# Patient Record
Sex: MALE | Race: WHITE | Employment: FULL TIME | URBAN - METROPOLITAN AREA
[De-identification: names, ages, dates, MRNs, and addresses within clinical notes are randomized per-mention and may not be internally consistent; named-entity substitution may affect disease eponyms.]

---

## 2017-04-17 ENCOUNTER — OFFICE VISIT (OUTPATIENT)
Dept: MEDICAL GROUP | Age: 27
End: 2017-04-17
Payer: COMMERCIAL

## 2017-04-17 VITALS
BODY MASS INDEX: 20.44 KG/M2 | OXYGEN SATURATION: 99 % | DIASTOLIC BLOOD PRESSURE: 76 MMHG | WEIGHT: 146 LBS | HEIGHT: 71 IN | HEART RATE: 106 BPM | SYSTOLIC BLOOD PRESSURE: 128 MMHG | TEMPERATURE: 97.3 F

## 2017-04-17 DIAGNOSIS — R63.4 WEIGHT LOSS: ICD-10-CM

## 2017-04-17 DIAGNOSIS — Z00.00 PREVENTATIVE HEALTH CARE: ICD-10-CM

## 2017-04-17 DIAGNOSIS — Z23 NEED FOR VACCINATION: ICD-10-CM

## 2017-04-17 DIAGNOSIS — G40.A01 NONINTRACTABLE ABSENCE EPILEPSY WITH STATUS EPILEPTICUS (HCC): ICD-10-CM

## 2017-04-17 DIAGNOSIS — D17.0 LIPOMA OF FACE: ICD-10-CM

## 2017-04-17 PROCEDURE — 90651 9VHPV VACCINE 2/3 DOSE IM: CPT | Performed by: FAMILY MEDICINE

## 2017-04-17 PROCEDURE — 99214 OFFICE O/P EST MOD 30 MIN: CPT | Mod: 25 | Performed by: FAMILY MEDICINE

## 2017-04-17 PROCEDURE — 90632 HEPA VACCINE ADULT IM: CPT | Performed by: FAMILY MEDICINE

## 2017-04-17 PROCEDURE — 90472 IMMUNIZATION ADMIN EACH ADD: CPT | Performed by: FAMILY MEDICINE

## 2017-04-17 PROCEDURE — 90471 IMMUNIZATION ADMIN: CPT | Performed by: FAMILY MEDICINE

## 2017-04-17 NOTE — ASSESSMENT & PLAN NOTE
Continues to be a symptomatic  2006 with his last seizure  Continues to be compliant with Keppra  Managed by his neurologist Dr. Young

## 2017-04-17 NOTE — ASSESSMENT & PLAN NOTE
LOST 120 Pounds in over a year   By diet and exercise  He denies any fevers, no chills, no night sweats, no bleeding gums, no easy bruisability, no recurrent nosebleeds, no blood in stool  Father had skin cancer  And PGF with prostate cancer

## 2017-04-17 NOTE — MR AVS SNAPSHOT
"        Jt House   2017 11:40 AM   Office Visit   MRN: [id number]    Department:  [address] [place] [address]   Dept Phone:  [phone]    Description:  Male : 1990   Provider:  Daniel Abreu M.D.           Allergies as of 2017     No Known Allergies      You were diagnosed with     Need for vaccination   [689297]       Preventative health care   [297979]       Lipoma of face   [005501]       Weight loss   [451853]         Vital Signs     Blood Pressure Pulse Temperature Height Weight Body Mass Index    128/76 mmHg 106 36.3 °C (97.3 °F) 1.803 m (5' 11\") 66.225 kg (146 lb) 20.37 kg/m2    Oxygen Saturation Smoking Status                99% Never Smoker           Basic Information     Date Of Birth Sex Race Ethnicity Preferred Language    1990 Male Unknown Unknown English      Your appointments     2017 11:00 AM   Established Patient with Daniel Abreu M.D.   [address] [id number] [phone]           You will be receiving a confirmation call a few days before your appointment from our automated call confirmation system.              Problem List              ICD-10-CM Priority Class Noted - Resolved    Absence epilepsy (CMS-HCC) G40.A09   10/27/2016 - Present    Preventative health care Z00.00   10/27/2016 - Present    Need for vaccination Z23   10/27/2016 - Present    Lipoma of face D17.0   2017 - Present    Weight loss R63.4   2017 - Present      Health Maintenance        Date Due Completion Dates    IMM HPV VACCINE (1 of 3 - Male 3 Dose Series) 2001 ---    IMM HEP B VACCINE (2 of 3 - Primary Series) 2008    IMM VARICELLA (CHICKENPOX) VACCINE (2 of 2 - 2 Dose Adolescent Series) 2008    IMM HEP A VACCINE (2 of 2 - Standard Series) 2008    IMM DTaP/Tdap/Td Vaccine (7 - Td) 2019, 1996, 1992, 1991, " 5/13/1991, 3/12/1991            Current Immunizations     Dtap Vaccine 4/25/1996, 8/26/1992, 8/12/1991, 5/13/1991, 3/12/1991    HIB Vaccine (ACTHIB/HIBERIX) 8/26/1992, 8/12/1991, 5/13/1991, 3/12/1991    HPV 9-VALENT VACCINE (GARDASIL 9)  Incomplete    Hepatitis A Vaccine, Adult  Incomplete    Hepatitis A Vaccine, Ped/Adol 11/24/2008    Hepatitis B Vaccine Non-Recombivax (Ped/Adol) 11/24/2008    Influenza TIV (IM) 10/21/2014, 10/8/2013, 11/24/2008    Influenza Vaccine Quad Inj (Preserved) 10/6/2015    MMR Vaccine 4/25/1996, 4/13/1992    Meningococcal Conjugate Vaccine MCV4 (Menactra) 11/24/2008    OPV - Historical Data 4/25/1996, 5/13/1991, 4/15/1991, 3/12/1991    Tdap Vaccine 5/18/2009    Varicella Vaccine Live 11/24/2008      Below and/or attached are the medications your provider expects you to take. Review all of your home medications and newly ordered medications with your provider and/or pharmacist. Follow medication instructions as directed by your provider and/or pharmacist. Please keep your medication list with you and share with your provider. Update the information when medications are discontinued, doses are changed, or new medications (including over-the-counter products) are added; and carry medication information at all times in the event of emergency situations     Allergies:  No Known Allergies          Medications  Valid as of: April 17, 2017 - 11:59 AM    Generic Name Brand Name Tablet Size Instructions for use    LevETIRAcetam (Tab) KEPPRA 500 MG Take 500 mg by mouth 2 times a day.        .                 Medicines prescribed today were sent to:     [facility]/PHARMACY #[id number] - [place], [place] - [address]    [address] [facility] [place] [address]    Phone: [phone] Fax: [phone]    Open 24 Hours?: No      Medication refill instructions:       If your prescription bottle indicates you have medication refills left, it is not necessary to call your provider’s office. Please contact your pharmacy and  they will refill your medication.    If your prescription bottle indicates you do not have any refills left, you may request refills at any time through one of the following ways: The online [employer] system (except Urgent Care), by calling your provider’s office, or by asking your pharmacy to contact your provider’s office with a refill request. Medication refills are processed only during regular business hours and may not be available until the next business day. Your provider may request additional information or to have a follow-up visit with you prior to refilling your medication.   *Please Note: Medication refills are assigned a new Rx number when refilled electronically. Your pharmacy may indicate that no refills were authorized even though a new prescription for the same medication is available at the pharmacy. Please request the medicine by name with the pharmacy before contacting your provider for a refill.        Your To Do List     Future Labs/Procedures Complete By Expires    CBC WITH DIFFERENTIAL  As directed 4/17/2018    COMP METABOLIC PANEL  As directed 4/17/2018    LIPID PROFILE  As directed 4/17/2018      Referral     A referral request has been sent to our patient care coordination department. Please allow 3-5 business days for us to process this request and contact you either by phone or mail. If you do not hear from us by the 5th business day, please call us at [phone].           [employer] Status: Patient Declined

## 2017-04-17 NOTE — ASSESSMENT & PLAN NOTE
Patient states that he's noticed a slight pull on his left face which has been there for many years. More noticeable now because he lost 20 pounds through diet and exercise, he would like to have it removed because it is causing pain.

## 2017-04-17 NOTE — PROGRESS NOTES
This medical record contains text that has been entered with the assistance of computer voice recognition and dictation software.  Therefore, it may contain unintended errors in text, spelling, punctuation, or grammar    No chief complaint on file.      Jt House is a 26 y.o. male here evaluation and management of: lipoma, epilepsy, vaccinations      HPI:     Weight loss  LOST 120 Pounds in over a year   By diet and exercise  He denies any fevers, no chills, no night sweats, no bleeding gums, no easy bruisability, no recurrent nosebleeds, no blood in stool  Father had skin cancer  And PGF with prostate cancer    Need for vaccination  Patient is due for several vaccinations today    Lipoma of face  Patient states that he's noticed a slight pull on his left face which has been there for many years. More noticeable now because he lost 20 pounds through diet and exercise, he would like to have it removed because it is causing pain.    Absence epilepsy (CMS-HCC)  Continues to be a symptomatic  2006 with his last seizure  Continues to be compliant with Keppra  Managed by his neurologist Dr. Young      Current medicines (including changes today)  Current Outpatient Prescriptions   Medication Sig Dispense Refill   • levetiracetam (KEPPRA) 500 MG Tab Take 500 mg by mouth 2 times a day.  3     No current facility-administered medications for this visit.     He  has a past medical history of Seizure (CMS-HCC).  He  has past surgical history that includes appendectomy (2003).  Social History   Substance Use Topics   • Smoking status: Never Smoker    • Smokeless tobacco: Never Used   • Alcohol Use: 0.0 oz/week     0 Standard drinks or equivalent per week      Comment: ocassion     Social History     Social History Narrative     Family History   Problem Relation Age of Onset   • Other Mother      Lupus   • Heart Disease Father    • Lung Disease Father    • No Known Problems Brother    • Psychiatry Maternal Grandmother       "depression   • Stroke Maternal Grandmother    • Heart Disease Maternal Grandfather    • No Known Problems Paternal Grandmother    • Cancer Paternal Grandfather      prostate cancer   • Diabetes Brother    • Asthma Brother    • Psychiatry Brother      Family Status   Relation Status Death Age   • Mother Alive    • Father     • Brother Alive    • Maternal Grandmother     • Maternal Grandfather     • Paternal Grandmother Alive    • Paternal Grandfather     • Brother Alive          ROS  Please see history of present illness    All other systems reviewed and are negative     Objective:     Blood pressure 128/76, pulse 106, temperature 36.3 °C (97.3 °F), height 1.803 m (5' 11\"), weight 66.225 kg (146 lb), SpO2 99 %. Body mass index is 20.37 kg/(m^2).  Physical Exam:    Constitutional: Alert, no distress.  Skin: Warm, dry, good turgor, no rashes in visible areas.  Face--below left cheekbone there is a 0.5 cm round symmetric, nontender, non-mobile mass, regular borders,  Eye: Equal, round and reactive, conjunctiva clear, lids normal.  ENMT: Lips without lesions, good dentition, oropharynx clear.  Neck: Trachea midline, no masses, no thyromegaly. No cervical or supraclavicular lymphadenopathy.  Respiratory: Unlabored respiratory effort, lungs clear to auscultation, no wheezes, no ronchi.  Cardiovascular: Normal S1, S2, no murmur, no edema.  Abdomen: Soft, non-tender, no masses, no hepatosplenomegaly.  Psych: Alert and oriented x3, normal affect and mood.          Assessment and Plan:   The following treatment plan was discussed, again this medical record contains text that has been entered with the assistance of computer voice recognition and dictation software.  Therefore, it may contain unintended errors in text, spelling, punctuation, or grammar      1. Lipoma of face    - REFERRAL TO DERMATOLOGY        2. Preventative health care  Needs new labs    - COMP METABOLIC PANEL; Future  - CBC " WITH DIFFERENTIAL; Future  - LIPID PROFILE; Future      3. Weight loss  This was done through diet and exercise  He is certain this was intentional    5. 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 Need for vaccination    Given today  - GARDASIL 9  - Hepatitis A Vaccine Adult IM        Followup: Return in about 3 months (around 7/17/2017) for Reevaluation.

## 2017-04-19 ENCOUNTER — HOSPITAL ENCOUNTER (OUTPATIENT)
Dept: LAB | Facility: MEDICAL CENTER | Age: 27
End: 2017-04-19
Attending: FAMILY MEDICINE
Payer: COMMERCIAL

## 2017-04-19 LAB
BASOPHILS # BLD AUTO: 0.8 % (ref 0–1.8)
BASOPHILS # BLD: 0.05 K/UL (ref 0–0.12)
EOSINOPHIL # BLD AUTO: 0.43 K/UL (ref 0–0.51)
EOSINOPHIL NFR BLD: 7 % (ref 0–6.9)
ERYTHROCYTE [DISTWIDTH] IN BLOOD BY AUTOMATED COUNT: 44.6 FL (ref 35.9–50)
HCT VFR BLD AUTO: 46.5 % (ref 42–52)
HGB BLD-MCNC: 15.7 G/DL (ref 14–18)
IMM GRANULOCYTES # BLD AUTO: 0.01 K/UL (ref 0–0.11)
IMM GRANULOCYTES NFR BLD AUTO: 0.2 % (ref 0–0.9)
LYMPHOCYTES # BLD AUTO: 2.41 K/UL (ref 1–4.8)
LYMPHOCYTES NFR BLD: 39.3 % (ref 22–41)
MCH RBC QN AUTO: 31 PG (ref 27–33)
MCHC RBC AUTO-ENTMCNC: 33.8 G/DL (ref 33.7–35.3)
MCV RBC AUTO: 91.9 FL (ref 81.4–97.8)
MONOCYTES # BLD AUTO: 0.5 K/UL (ref 0–0.85)
MONOCYTES NFR BLD AUTO: 8.2 % (ref 0–13.4)
NEUTROPHILS # BLD AUTO: 2.73 K/UL (ref 1.82–7.42)
NEUTROPHILS NFR BLD: 44.5 % (ref 44–72)
NRBC # BLD AUTO: 0 K/UL
NRBC BLD AUTO-RTO: 0 /100 WBC
PLATELET # BLD AUTO: 282 K/UL (ref 164–446)
PMV BLD AUTO: 10.8 FL (ref 9–12.9)
RBC # BLD AUTO: 5.06 M/UL (ref 4.7–6.1)
WBC # BLD AUTO: 6.1 K/UL (ref 4.8–10.8)

## 2017-04-19 PROCEDURE — 36415 COLL VENOUS BLD VENIPUNCTURE: CPT

## 2017-04-19 PROCEDURE — 80053 COMPREHEN METABOLIC PANEL: CPT

## 2017-04-19 PROCEDURE — 80061 LIPID PANEL: CPT

## 2017-04-19 PROCEDURE — 85025 COMPLETE CBC W/AUTO DIFF WBC: CPT

## 2017-04-20 LAB
ALBUMIN SERPL BCP-MCNC: 4.9 G/DL (ref 3.2–4.9)
ALBUMIN/GLOB SERPL: 1.6 G/DL
ALP SERPL-CCNC: 80 U/L (ref 30–99)
ALT SERPL-CCNC: 20 U/L (ref 2–50)
ANION GAP SERPL CALC-SCNC: 8 MMOL/L (ref 0–11.9)
AST SERPL-CCNC: 23 U/L (ref 12–45)
BILIRUB SERPL-MCNC: 0.9 MG/DL (ref 0.1–1.5)
BUN SERPL-MCNC: 9 MG/DL (ref 8–22)
CALCIUM SERPL-MCNC: 10.2 MG/DL (ref 8.5–10.5)
CHLORIDE SERPL-SCNC: 103 MMOL/L (ref 96–112)
CHOLEST SERPL-MCNC: 152 MG/DL (ref 100–199)
CO2 SERPL-SCNC: 27 MMOL/L (ref 20–33)
CREAT SERPL-MCNC: 0.78 MG/DL (ref 0.5–1.4)
GFR SERPL CREATININE-BSD FRML MDRD: >60 ML/MIN/1.73 M 2
GLOBULIN SER CALC-MCNC: 3 G/DL (ref 1.9–3.5)
GLUCOSE SERPL-MCNC: 72 MG/DL (ref 65–99)
HDLC SERPL-MCNC: 52 MG/DL
LDLC SERPL CALC-MCNC: 90 MG/DL
POTASSIUM SERPL-SCNC: 3.9 MMOL/L (ref 3.6–5.5)
PROT SERPL-MCNC: 7.9 G/DL (ref 6–8.2)
SODIUM SERPL-SCNC: 138 MMOL/L (ref 135–145)
TRIGL SERPL-MCNC: 48 MG/DL (ref 0–149)

## 2017-04-24 ENCOUNTER — TELEPHONE (OUTPATIENT)
Dept: MEDICAL GROUP | Age: 27
End: 2017-04-24

## 2017-04-24 NOTE — TELEPHONE ENCOUNTER
----- Message from Daniel Abreu M.D. sent at 4/24/2017  7:01 AM PDT -----  All of the patients labs were normal. They may see some that are highlighted, however these have no clinical significance.    Landon Abreu MD  [facility]  [address] [place] [id number]

## 2017-04-24 NOTE — TELEPHONE ENCOUNTER
Phone Number Called: [phone] (home)     Message: Called and LVM for Pt to call back regarding his results    Left Message for patient to call back: yes

## 2017-07-20 ENCOUNTER — OFFICE VISIT (OUTPATIENT)
Dept: MEDICAL GROUP | Age: 27
End: 2017-07-20
Payer: COMMERCIAL

## 2017-07-20 VITALS
SYSTOLIC BLOOD PRESSURE: 118 MMHG | TEMPERATURE: 98.2 F | DIASTOLIC BLOOD PRESSURE: 68 MMHG | OXYGEN SATURATION: 95 % | WEIGHT: 140.4 LBS | HEART RATE: 81 BPM | BODY MASS INDEX: 19.65 KG/M2 | HEIGHT: 71 IN

## 2017-07-20 DIAGNOSIS — D17.0 LIPOMA OF FACE: ICD-10-CM

## 2017-07-20 DIAGNOSIS — Z23 NEED FOR VACCINATION: ICD-10-CM

## 2017-07-20 DIAGNOSIS — R63.4 WEIGHT LOSS: ICD-10-CM

## 2017-07-20 PROCEDURE — 90472 IMMUNIZATION ADMIN EACH ADD: CPT | Performed by: FAMILY MEDICINE

## 2017-07-20 PROCEDURE — 90746 HEPB VACCINE 3 DOSE ADULT IM: CPT | Performed by: FAMILY MEDICINE

## 2017-07-20 PROCEDURE — 90651 9VHPV VACCINE 2/3 DOSE IM: CPT | Performed by: FAMILY MEDICINE

## 2017-07-20 PROCEDURE — 99214 OFFICE O/P EST MOD 30 MIN: CPT | Mod: 25 | Performed by: FAMILY MEDICINE

## 2017-07-20 PROCEDURE — 90471 IMMUNIZATION ADMIN: CPT | Performed by: FAMILY MEDICINE

## 2017-07-20 NOTE — MR AVS SNAPSHOT
"        Jt House   2017 4:40 PM   Office Visit   MRN: [id number]    Department:  [address] [place] [address]   Dept Phone:  [phone]    Description:  Male : 1990   Provider:  Daniel Abreu M.D.           Reason for Visit     Other see reason for visit      Allergies as of 2017     No Known Allergies      You were diagnosed with     Need for vaccination   [848331]       Weight loss   [093742]       Lipoma of face   [605901]         Vital Signs     Blood Pressure Pulse Temperature Height Weight Body Mass Index    118/68 mmHg 81 36.8 °C (98.2 °F) 1.803 m (5' 10.98\") 63.685 kg (140 lb 6.4 oz) 19.59 kg/m2    Oxygen Saturation Smoking Status                95% Never Smoker           Basic Information     Date Of Birth Sex Race Ethnicity Preferred Language    1990 Male Unknown, White Unknown English      Your appointments     Aug 04, 2017  1:00 PM   Established Patient with Daniel Abreu M.D.   [address] [address]   [phone]           You will be receiving a confirmation call a few days before your appointment from our automated call confirmation system.              Problem List              ICD-10-CM Priority Class Noted - Resolved    Absence epilepsy (CMS-HCC) G40.A09   10/27/2016 - Present    Preventative health care Z00.00   10/27/2016 - Present    Need for vaccination Z23   10/27/2016 - Present    Lipoma of face D17.0   2017 - Present    Weight loss R63.4   2017 - Present      Health Maintenance        Date Due Completion Dates    IMM HEP B VACCINE (2 of 3 - Primary Series) 2008    IMM VARICELLA (CHICKENPOX) VACCINE (2 of 2 - 2 Dose Adolescent Series) 2008    IMM HPV VACCINE (2 of 3 - Male 3 Dose Series) 2017    IMM INFLUENZA (1) 2017 10/6/2015, 10/21/2014, 10/8/2013, 2008    IMM DTaP/Tdap/Td Vaccine (7 - Td) 2019 " 5/18/2009, 4/25/1996, 8/26/1992, 8/12/1991, 5/13/1991, 3/12/1991            Current Immunizations     Dtap Vaccine 4/25/1996, 8/26/1992, 8/12/1991, 5/13/1991, 3/12/1991    HIB Vaccine (ACTHIB/HIBERIX) 8/26/1992, 8/12/1991, 5/13/1991, 3/12/1991    HPV 9-VALENT VACCINE (GARDASIL 9)  Incomplete, 4/17/2017    Hepatitis A Vaccine, Adult 4/17/2017    Hepatitis A Vaccine, Ped/Adol 11/24/2008    Hepatitis B Vaccine Non-Recombivax (Ped/Adol) 11/24/2008    Hepatitis B Vaccine Recombivax (Adol/Adult)  Incomplete    Influenza TIV (IM) 10/21/2014, 10/8/2013, 11/24/2008    Influenza Vaccine Quad Inj (Preserved) 10/6/2015    MMR Vaccine 4/25/1996, 4/13/1992    Meningococcal Conjugate Vaccine MCV4 (Menactra) 11/24/2008    OPV - Historical Data 4/25/1996, 5/13/1991, 4/15/1991, 3/12/1991    Tdap Vaccine 5/18/2009    Varicella Vaccine Live 11/24/2008      Below and/or attached are the medications your provider expects you to take. Review all of your home medications and newly ordered medications with your provider and/or pharmacist. Follow medication instructions as directed by your provider and/or pharmacist. Please keep your medication list with you and share with your provider. Update the information when medications are discontinued, doses are changed, or new medications (including over-the-counter products) are added; and carry medication information at all times in the event of emergency situations     Allergies:  No Known Allergies          Medications  Valid as of: July 20, 2017 -  4:51 PM    Generic Name Brand Name Tablet Size Instructions for use    LevETIRAcetam (Tab) KEPPRA 500 MG Take 500 mg by mouth 2 times a day.        .                 Medicines prescribed today were sent to:     [facility]/PHARMACY #[id number] - [place] - [address]    [address] [facility] [facility] [place] [address]    Phone: [phone] Fax: [phone]    Open 24 Hours?: No      Medication refill instructions:       If your prescription bottle indicates you have  medication refills left, it is not necessary to call your provider’s office. Please contact your pharmacy and they will refill your medication.    If your prescription bottle indicates you do not have any refills left, you may request refills at any time through one of the following ways: The online [employer] system (except Urgent Care), by calling your provider’s office, or by asking your pharmacy to contact your provider’s office with a refill request. Medication refills are processed only during regular business hours and may not be available until the next business day. Your provider may request additional information or to have a follow-up visit with you prior to refilling your medication.   *Please Note: Medication refills are assigned a new Rx number when refilled electronically. Your pharmacy may indicate that no refills were authorized even though a new prescription for the same medication is available at the pharmacy. Please request the medicine by name with the pharmacy before contacting your provider for a refill.           [employer] Status: Patient Declined

## 2017-07-20 NOTE — ASSESSMENT & PLAN NOTE
He looked at wedding pictures at brothers wedding  Patient lost 120 pounds in the span of 12 months  He did this by increasing cardiovascular exercise  And decreasing calorie intake   He denies any fevers, no chills, no night sweats, no easy bleeding of gums, no easy bruisability, no recurrent nosebleeds, no blood in stool.

## 2017-07-20 NOTE — ASSESSMENT & PLAN NOTE
Patient had his left upper cheek lipoma removed, and the wound is healing well. He is pleased with the results. However, he has not been told that the pathology, he was just curious about that.

## 2017-07-20 NOTE — PROGRESS NOTES
This medical record contains text that has been entered with the assistance of computer voice recognition and dictation software.  Therefore, it may contain unintended errors in text, spelling, punctuation, or grammar    Chief Complaint   Patient presents with   • Other     see reason for visit     Jt House is a 26 y.o. male here evaluation and management of: vaccinations, routine f/u      HPI:     Weight loss  He looked at wedding pictures at brothers wedding  Patient lost 120 pounds in the span of 12 months  He did this by increasing cardiovascular exercise  And decreasing calorie intake   He denies any fevers, no chills, no night sweats, no easy bleeding of gums, no easy bruisability, no recurrent nosebleeds, no blood in stool.      Lipoma of face  Patient had his left upper cheek lipoma removed, and the wound is healing well. He is pleased with the results. However, he has not been told that the pathology, he was just curious about that.    Need for vaccination  Patient is due for second hepatitis B vaccination  As well as second HPV        Current medicines (including changes today)  Current Outpatient Prescriptions   Medication Sig Dispense Refill   • levetiracetam (KEPPRA) 500 MG Tab Take 500 mg by mouth 2 times a day.  3     No current facility-administered medications for this visit.     He  has a past medical history of Seizure (CMS-HCC).  He  has past surgical history that includes appendectomy (2003).  Social History   Substance Use Topics   • Smoking status: Never Smoker    • Smokeless tobacco: Never Used   • Alcohol Use: 0.0 oz/week     0 Standard drinks or equivalent per week      Comment: ocassion     Social History     Social History Narrative     Family History   Problem Relation Age of Onset   • Other Mother      Lupus   • Heart Disease Father    • Lung Disease Father    • No Known Problems Brother    • Psychiatry Maternal Grandmother      depression   • Stroke Maternal Grandmother    •  "Heart Disease Maternal Grandfather    • No Known Problems Paternal Grandmother    • Cancer Paternal Grandfather      prostate cancer   • Diabetes Brother    • Asthma Brother    • Psychiatry Brother      Family Status   Relation Status Death Age   • Mother Alive    • Father     • Brother Alive    • Maternal Grandmother     • Maternal Grandfather     • Paternal Grandmother Alive    • Paternal Grandfather     • Brother Alive          ROS    Please see hpi     All other systems reviewed and are negative     Objective:     Blood pressure 118/68, pulse 81, temperature 36.8 °C (98.2 °F), height 1.803 m (5' 10.98\"), weight 63.685 kg (140 lb 6.4 oz), SpO2 95 %. Body mass index is 19.59 kg/(m^2).  Physical Exam:    Constitutional: Alert, no distress.  Skin: Warm, dry, good turgor, no rashes in visible areas.  Eye: Equal, round and reactive, conjunctiva clear, lids normal.  ENMT: Lips without lesions, good dentition, oropharynx clear.  Neck: Trachea midline, no masses, no thyromegaly. No cervical or supraclavicular lymphadenopathy.  Respiratory: Unlabored respiratory effort, lungs clear to auscultation, no wheezes, no ronchi.  Cardiovascular: Normal S1, S2, no murmur, no edema.  Abdomen: Soft, non-tender, no masses, no hepatosplenomegaly.  Psych: Alert and oriented x3, normal affect and mood.          Assessment and Plan:   The following treatment plan was discussed      1. Weight loss  He is certain that he has done this intentionally  He now plans to put on muscle  He will not lose anymore weight, he states      2. Need for vaccination  Given today    - Hepatitis B Vaccine Adult IM  - GARDASIL 9      3. Lipoma of face  resolved      HEALTH MAINTENANCE: up to date    Instructed to Follow up in clinic or ER for worsening symptoms, difficulty breathing, lack of expected recovery, or should new symptoms or problems arise.    Followup: Return in about 2 weeks (around 8/3/2017) for " Reevaluation.       Once again this medical record contains text that has been entered with the assistance of computer voice recognition and dictation software.  Therefore, it may contain unintended errors in text, spelling, punctuation, or grammar

## 2017-08-03 ENCOUNTER — TELEPHONE (OUTPATIENT)
Dept: MEDICAL GROUP | Age: 27
End: 2017-08-03

## 2017-08-03 NOTE — TELEPHONE ENCOUNTER
ESTABLISHED PATIENT PRE-VISIT PLANNING     Note: Patient will not be contacted if there is no indication to call.     1.  Reviewed notes from the last few office visits within the medical group: Yes    2.  If any orders were placed at last visit or intended to be done for this visit (i.e. 6 mos follow-up), do we have Results/Consult Notes?        •  Labs - Labs were not ordered at last office visit.       •  Imaging - Imaging was not ordered at last office visit.       •  Referrals - Referral ordered, patient has NOT been seen.    3. Is this appointment scheduled as a Hospital Follow-Up? No    4.  Immunizations were updated in Epic using WebIZ?: Epic matches WebIZ       •  Web Iz Recommendations: FLU and VARICELLA (Chicken Pox)     5.  Patient is due for the following Health Maintenance Topics:   Health Maintenance Due   Topic Date Due   • IMM VARICELLA (CHICKENPOX) VACCINE (2 of 2 - 2 Dose Adolescent Series) 12/22/2008       - Patient is up-to-date on all Health Maintenance topics. No records have been requested at this time.    6.  Patient was NOT informed to arrive 15 min prior to their scheduled appointment and bring in their medication bottles.

## 2017-08-04 ENCOUNTER — OFFICE VISIT (OUTPATIENT)
Dept: MEDICAL GROUP | Age: 27
End: 2017-08-04
Payer: COMMERCIAL

## 2017-08-04 VITALS
WEIGHT: 145 LBS | OXYGEN SATURATION: 99 % | HEIGHT: 71 IN | HEART RATE: 78 BPM | TEMPERATURE: 97.3 F | SYSTOLIC BLOOD PRESSURE: 102 MMHG | DIASTOLIC BLOOD PRESSURE: 78 MMHG | BODY MASS INDEX: 20.3 KG/M2

## 2017-08-04 DIAGNOSIS — Z23 NEED FOR VACCINATION: ICD-10-CM

## 2017-08-04 DIAGNOSIS — R63.4 WEIGHT LOSS: ICD-10-CM

## 2017-08-04 PROCEDURE — 90471 IMMUNIZATION ADMIN: CPT | Performed by: FAMILY MEDICINE

## 2017-08-04 PROCEDURE — 99214 OFFICE O/P EST MOD 30 MIN: CPT | Mod: 25 | Performed by: FAMILY MEDICINE

## 2017-08-04 PROCEDURE — 90716 VAR VACCINE LIVE SUBQ: CPT | Performed by: FAMILY MEDICINE

## 2017-08-04 NOTE — ASSESSMENT & PLAN NOTE
He states that he is getting 2200 calories per day  He does weight training 3 times per wk  And cardio 2 times per wk (couck to 5K)  He cooks often, veggies, fish, rice  His wife is 5 ft 5 and 110lbs    He actually lost 120 pounds in the span of the year  By increasing cardiovascular exercising and decreasing calorie intake  He denies the possibility of having an eating disorder.  He denies binging and purging  He denies any breast mood, no suicidal or homicidal ideations  He has good support at home with his wife who is also thin

## 2017-08-04 NOTE — MR AVS SNAPSHOT
"Jt House   2017 1:00 PM   Office Visit   MRN: [id number]    Department:  [address] [place] [address]   Dept Phone:  [phone]    Description:  Male : 1990   Provider:  Daniel Abreu M.D.           Reason for Visit     Weight Loss follow up      Allergies as of 2017     No Known Allergies      You were diagnosed with     Need for vaccination   [706318]       Weight loss   [335497]         Vital Signs     Blood Pressure Pulse Temperature Height Weight Body Mass Index    102/78 mmHg 78 36.3 °C (97.3 °F) 1.801 m (5' 10.91\") 65.772 kg (145 lb) 20.28 kg/m2    Oxygen Saturation Smoking Status                99% Never Smoker           Basic Information     Date Of Birth Sex Race Ethnicity Preferred Language    1990 Male Unknown, White Unknown English      Your appointments     Aug 24, 2017 11:40 AM   Established Patient with Daniel Abreu M.D.   [address] [id number] [phone]           You will be receiving a confirmation call a few days before your appointment from our automated call confirmation system.              Problem List              ICD-10-CM Priority Class Noted - Resolved    Absence epilepsy (CMS-HCC) G40.A09   10/27/2016 - Present    Preventative health care Z00.00   10/27/2016 - Present    Need for vaccination Z23   10/27/2016 - Present    Lipoma of face D17.0   2017 - Present    Weight loss R63.4   2017 - Present      Health Maintenance        Date Due Completion Dates    IMM VARICELLA (CHICKENPOX) VACCINE (2 of 2 - 2 Dose Adolescent Series) 2008    IMM INFLUENZA (1) 2017 10/6/2015, 10/21/2014, 10/8/2013, 2008    IMM HEP B VACCINE (3 of 3 - Primary Series) 2017, 2008    IMM HPV VACCINE (3 of 3 - Male 3 Dose Series) 2017, 2017    IMM DTaP/Tdap/Td Vaccine (7 - Td) 2019, 1996, 1992, " 8/12/1991, 5/13/1991, 3/12/1991            Current Immunizations     Dtap Vaccine 4/25/1996, 8/26/1992, 8/12/1991, 5/13/1991, 3/12/1991    HIB Vaccine (ACTHIB/HIBERIX) 8/26/1992, 8/12/1991, 5/13/1991, 3/12/1991    HPV 9-VALENT VACCINE (GARDASIL 9) 7/20/2017, 4/17/2017    Hepatitis A Vaccine, Adult 4/17/2017    Hepatitis A Vaccine, Ped/Adol 11/24/2008    Hepatitis B Vaccine Non-Recombivax (Ped/Adol) 11/24/2008    Hepatitis B Vaccine Recombivax (Adol/Adult) 7/20/2017    Influenza TIV (IM) 10/21/2014, 10/8/2013, 11/24/2008    Influenza Vaccine Quad Inj (Preserved) 10/6/2015    MMR Vaccine 4/25/1996, 4/13/1992    Meningococcal Conjugate Vaccine MCV4 (Menactra) 11/24/2008    OPV - Historical Data 4/25/1996, 5/13/1991, 4/15/1991, 3/12/1991    Tdap Vaccine 5/18/2009    Varicella Vaccine Live  Deferred (Insufficient Supply), 11/24/2008      Below and/or attached are the medications your provider expects you to take. Review all of your home medications and newly ordered medications with your provider and/or pharmacist. Follow medication instructions as directed by your provider and/or pharmacist. Please keep your medication list with you and share with your provider. Update the information when medications are discontinued, doses are changed, or new medications (including over-the-counter products) are added; and carry medication information at all times in the event of emergency situations     Allergies:  No Known Allergies          Medications  Valid as of: August 04, 2017 -  1:32 PM    Generic Name Brand Name Tablet Size Instructions for use    LevETIRAcetam (Tab) KEPPRA 500 MG Take 500 mg by mouth 2 times a day.        .                 Medicines prescribed today were sent to:     [id number]  [place], [place] - [address]    [address] [place] [address]    Phone: [phone] Fax: [phone]    Open 24 Hours?: No      Medication refill instructions:       If your prescription bottle indicates you have  medication refills left, it is not necessary to call your provider’s office. Please contact your pharmacy and they will refill your medication.    If your prescription bottle indicates you do not have any refills left, you may request refills at any time through one of the following ways: The online App [employer] system (except Urgent Care), by calling your provider’s office, or by asking your pharmacy to contact your provider’s office with a refill request. Medication refills are processed only during regular business hours and may not be available until the next business day. Your provider may request additional information or to have a follow-up visit with you prior to refilling your medication.   *Please Note: Medication refills are assigned a new Rx number when refilled electronically. Your pharmacy may indicate that no refills were authorized even though a new prescription for the same medication is available at the pharmacy. Please request the medicine by name with the pharmacy before contacting your provider for a refill.        Referral     A referral request has been sent to our patient care coordination department. Please allow 3-5 business days for us to process this request and contact you either by phone or mail. If you do not hear from us by the 5th business day, please call us at [phone].           [employer] Status: Patient Declined

## 2017-08-04 NOTE — PROGRESS NOTES
This medical record contains text that has been entered with the assistance of computer voice recognition and dictation software.  Therefore, it may contain unintended errors in text, spelling, punctuation, or grammar    Chief Complaint   Patient presents with   • Weight Loss     follow up       Jt House is a 26 y.o. male here evaluation and management of: Weight loss      HPI:     Weight loss  He states that he is getting 2200 calories per day  He does weight training 3 times per wk  And cardio 2 times per wk (couck to 5K)  He cooks often, veggies, fish, rice  His wife is 5 ft 5 and 110lbs    He actually lost 120 pounds in the span of the year  By increasing cardiovascular exercising and decreasing calorie intake  He denies the possibility of having an eating disorder.  He denies binging and purging  He denies any breast mood, no suicidal or homicidal ideations  He has good support at home with his wife who is also thin      Current medicines (including changes today)  Current Outpatient Prescriptions   Medication Sig Dispense Refill   • levetiracetam (KEPPRA) 500 MG Tab Take 500 mg by mouth 2 times a day.  3     No current facility-administered medications for this visit.     He  has a past medical history of Seizure (CMS-HCC).  He  has past surgical history that includes appendectomy (2003).  Social History   Substance Use Topics   • Smoking status: Never Smoker    • Smokeless tobacco: Never Used   • Alcohol Use: 0.0 oz/week     0 Standard drinks or equivalent per week      Comment: ocassion     Social History     Social History Narrative     Family History   Problem Relation Age of Onset   • Other Mother      Lupus   • Heart Disease Father    • Lung Disease Father    • No Known Problems Brother    • Psychiatry Maternal Grandmother      depression   • Stroke Maternal Grandmother    • Heart Disease Maternal Grandfather    • No Known Problems Paternal Grandmother    • Cancer Paternal Grandfather       "prostate cancer   • Diabetes Brother    • Asthma Brother    • Psychiatry Brother      Family Status   Relation Status Death Age   • Mother Alive    • Father     • Brother Alive    • Maternal Grandmother     • Maternal Grandfather     • Paternal Grandmother Alive    • Paternal Grandfather     • Brother Alive          ROS    Please see hpi     All other systems reviewed and are negative     Objective:     Blood pressure 102/78, pulse 78, temperature 36.3 °C (97.3 °F), height 1.801 m (5' 10.91\"), weight 65.772 kg (145 lb), SpO2 99 %. Body mass index is 20.28 kg/(m^2).  Physical Exam:    Constitutional: Alert, no distress.  Skin: Warm, dry, good turgor, no rashes in visible areas.  Eye: Equal, round and reactive, conjunctiva clear, lids normal.  ENMT: Lips without lesions, good dentition, oropharynx clear.  Neck: Trachea midline, no masses, no thyromegaly. No cervical or supraclavicular lymphadenopathy.  Respiratory: Unlabored respiratory effort, lungs clear to auscultation, no wheezes, no ronchi.  Cardiovascular: Normal S1, S2, no murmur, no edema.  Abdomen: Soft, non-tender, no masses, no hepatosplenomegaly.  Psych: Alert and oriented x3, normal affect and mood.          Assessment and Plan:   The following treatment plan was discussed    1. Weight loss  We discussed the possibility of an eating disorder  He agreed to be evaluated by psychology  He also contracted for safety  He denies any suicidal or homicidal ideations      - REFERRAL TO PSYCHOLOGY    2. Need for vaccination    Given today  - Varicella Vaccine SQ (Varivax)        HEALTH MAINTENANCE:    Instructed to Follow up in clinic or ER for worsening symptoms, difficulty breathing, lack of expected recovery, or should new symptoms or problems arise.    Followup: Return in about 4 weeks (around 2017) for punch biopsy.       Once again this medical record contains text that has been entered with the assistance of computer voice " recognition and dictation software.  Therefore, it may contain unintended errors in text, spelling, punctuation, or grammar

## 2017-08-10 ENCOUNTER — OFFICE VISIT (OUTPATIENT)
Dept: BEHAVIORAL HEALTH | Facility: PHYSICIAN GROUP | Age: 27
End: 2017-08-10
Payer: COMMERCIAL

## 2017-08-10 DIAGNOSIS — F43.20 ADJUSTMENT DISORDER, UNSPECIFIED: ICD-10-CM

## 2017-08-10 PROCEDURE — 90791 PSYCH DIAGNOSTIC EVALUATION: CPT | Performed by: MARRIAGE & FAMILY THERAPIST

## 2017-08-10 NOTE — BH THERAPY
[facility]  INITIAL ASSESSMENT    Name: Jt House  MRN: [id number]  : 1990  Age: 26 y.o.  Date of assessment: 8/10/2017  PCP: Daniel Abrue M.D.  Persons in attendance: Patient  Total session time: 45 minutes      CHIEF COMPLAINT AND HISTORY OF PRESENTING PROBLEM:  (as stated by Patient):  Jt House is a 26 y.o., Unknown  White male referred for assessment by No ref. provider found.  Primary presenting issue includes   Chief Complaint   Patient presents with   • Anxiety   . Disordered eating    FAMILY/SOCIAL HISTORY  Current living situation/household members: lives w/ wife of three years, good reln  Relevant family history/structure/dynamics: raised in [place] by bioparents, fa was an alcoholic but pt didn't realize it till he was 17; mom has lupus; relns s/w distant; mom takes care of youngest bro (21) who has mental health probs; fa  of complications of pneumonia (and lung/heart probs) about a yr ago at 54; one bro works in [place]; says americo is obese  Current family/social stressors: none noted  Quality/quantity of current family and/or social support: wife, has friends  Does patient/parent report a family history of behavioral health issues, diagnoses, or treatment? Yes  Family History   Problem Relation Age of Onset   • Other Mother      Lupus   • Heart Disease Father    • Lung Disease Father    • Alcohol abuse Father    • No Known Problems Brother    • Psychiatry Maternal Grandmother      depression   • Stroke Maternal Grandmother    • Heart Disease Maternal Grandfather    • No Known Problems Paternal Grandmother    • Cancer Paternal Grandfather      prostate cancer   • Diabetes Brother    • Asthma Brother    • Psychiatry Brother         BEHAVIORAL HEALTH TREATMENT HISTORY  Does patient/parent report a history of prior behavioral health treatment for patient? No:    History of untreated behavioral health issues identified? No    MEDICAL HISTORY  Primary  care behavioral health screenings: Patient Health Questionaire                                     If depressive symptoms identified deferred to follow up visit unless specifically addressed in assesment and plan.    Interpretation of PHQ-9 Total Score   Score Severity   1-4 No Depression   5-9 Mild Depression   10-14 Moderate Depression   15-19 Moderately Severe Depression   20-27 Severe Depression       Past medical/surgical history:   Past Medical History   Diagnosis Date   • Seizure (CMS-HCC)       Past Surgical History   Procedure Laterality Date   • Appendectomy  2003        Medication Allergies:  Review of patient's allergies indicates no known allergies.   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 No  Does patient/parent report nutritional concerns? Yes  Counts calories  Does patient/parent report change in appetite or weight loss/gain? Yes pt lost half body wt in a yr beginning May 2016, currently counts calories but is working on gaining muscle wt  Does patient/parent report history of eating disorder symptoms? No but primary concerned abt this  Does patient/parent report dental problem? No  Does patient/parent report physical pain? No   Indicate if pain is acute or chronic, and location: na   Pain scale rating:       Does patient/parent report functional impact of medical, developmental, or pain issues?   N\A    EDUCATIONAL/LEARNING HISTORY  Is patient currently enrolled in a school/educational program?   No:   Highest grade level completed: BS, two degrees, in computer science and software engineering, both gained in 4 yrs  School performance/functioning: excellent student  History of Special Education/repeated  grades/learning issues: no  Preferred learning style: all  Current learning needs (large print, language barrier, etc):  no    EMPLOYMENT/RESOURCES  Is the patient currently employed? Yes   Does the patient/parent report adequate financial resources? Yes  Does patient identify impact of presenting issue on work functioning? No  Work or income-related stressors:  na     HISTORY:  Does patient report current or past enlistment? No    [If yes, complete below items]  Does patient report history of exposure to combat? No  Does patient report history of  sexual trauma? No  Does patient report other -related stressors? No    SPIRITUAL/CULTURAL/IDENTITY:  What are the patient’s/family’s spiritual beliefs or practices? None, family was very Evangelical  What is the patient’s cultural or ethnic background/identity? cauc  How does the patient identify their sexual orientation? het  How does the patient identify their gender? male  Does the patient identify any spiritual/cultural/identity factors as relevant to the presenting issue? No    LEGAL HISTORY  Has the patient ever been involved with juvenile, adult, or family legal systems? No   [If yes, trigger section below:]  Does patient report ever being a victim of a crime?  No  Does patient report involvement in any current legal issues?  No  Does patient report ever being arrested or committing a crime? No  Does patient report any current agency (parole/probation/CPS/) involvement? No    ABUSE/NEGLECT/TRAUMA SCREENING  Does patient report feeling “unsafe” in his/her home, or afraid of anyone? No  Does patient report any history of physical, sexual, or emotional abuse? No  Does parent or significant other report any of the above? na  Is there evidence of neglect by self? No  Is there evidence of neglect by a caregiver? No  Does the patient/parent report any history of CPS/APS/police involvement related to suspected  abuse/neglect or domestic violence? No  Does the patient/parent report any other history of potentially traumatic life events? No  Based on the information provided during the current assessment, is a mandated report of suspected abuse/neglect being made?  No     SAFETY ASSESSMENT - SELF  Does patient acknowledge current or past symptoms of dangerousness to self? No  Does parent/significant other report patient has current or past symptoms of dangerousness to self? na      Recent change in frequency/specificity/intensity of suicidal thoughts or self-harm behavior? No  Current access to firearms, medications, or other identified means of suicide/self-harm? No  If yes, willing to restrict access to means of suicide/self-harm? na  Protective factors present: Future-oriented, Good impulse control, Positive coping skills and Strong family connections    Current Suicide Risk: Low  Crisis Safety Plan completed and copy given to patient: No    SAFETY ASSESSMENT - OTHERS  Does paor past symptoms of aggressive behavior or risk to others? No  Does parent/significant othtient acknowledge current or past symptoms of aggressive behavior or risk to others? na  Does parent/significant other report patient has current or past symptoms of aggressive behavior or risk to others? na    Recent change in frequency/specificity/intensity of thoughts or threats to harm others? No  Current access to firearms/other identified means of harm? No  If yes, willing to restrict access to weapons/means of harm? na  Protective factors present: Good frustration tolerance, Moral/spiritual prohibition, Well-developed sense of empathy, Stable relationships and Low rumination/obsession    Current Homicide Risk:  Not applicable  Crisis Safety Plan completed and copy given to patient? No  Based on information provided during the current assessment, is a mandated “duty to warn” being exercised? No    SUBSTANCE USE/ADDICTION HISTORY  [] Not applicable -  patient 10 years of age or younger    Is there a family history of substance use/addiction? Yes  Does patient acknowledge or parent/significant other report use of/dependence on substances? No  Last time patient used alcohol: weeks  Within the past week? No  Last time patient used marijuana: months  Within the past month? No  Any other street drugs ever tried even once? Yes as sophomore in [place] maybe a couple of times  Any use of prescription medications/pills without a prescription, or for reasons others than originally prescribed?  No  Any other addictive behavior reported (gambling, shopping, sex)? No     Drug History:  Amphetamine:      Cannibis:      Cocaine:      Ecstasy:      Hallucinogen:      Inhalant:       Opiate:      Other:      Sedative:           What consequences does the patient associate with any of the above substance use and or addictive behaviors? None    Patient’s motivation/readiness for change: na    [] Patient denies use of any substance/addictive behaviors    STRENGTHS/ASSETS  Strengths Identified by interviewer: Self-awareness, Family suppport, Stable relationships and Cognitive flexibility  Strengths Identified by patient: intelligent, good at math    MENTAL STATUS/OBSERVATIONS   Participation: Active verbal participation  Grooming: Neat  Orientation:Alert   Behavior: Calm  Eye contact: Good   Mood:Anxious  Affect:Flexible  Thought process: Logical  Thought content:  Within normal limits  Speech: Rate within normal limits  Perception: Within normal limits  Memory: No gross evidence of memory deficits  Insight: Good  Judgment:  Good  Other:    Family/couple interaction observations: na    RESULTS OF SCREENING MEASURES:  [] Not applicable  Measure:   Score:     Measure:   Score:       CLINICAL FORMULATION: 27 yo Jt referred by primary for concern over possible disordered eating patterns; lost 50% of body wt over a year (260-138), which he says he did because he didn't want to be fat  anymore; did by counting calories and is still able to easily reel off the number of calories he takes in daily; says he's increased them to gain muscle mass and does wt training;does not feel focus on food interferes w/ life activities; play board games w/ friends, video games, hikes, reads; sleep okay; denies worries, anxiety, depressive sx      DIAGNOSTIC IMPRESSION(S):  1. Adjustment disorder, unspecified          IDENTIFIED NEEDS/PLAN:  [If any of these marked, trigger DISPOSITION list]  Other: possible disordered eating  no appt scheduled    Does patient express agreement with the above plan? Yes     Referral appointment(s) scheduled? No will contact Dr. Abreu w/ assessment       DON Rose

## 2017-08-24 ENCOUNTER — OFFICE VISIT (OUTPATIENT)
Dept: MEDICAL GROUP | Age: 27
End: 2017-08-24
Payer: COMMERCIAL

## 2017-08-24 ENCOUNTER — HOSPITAL ENCOUNTER (OUTPATIENT)
Facility: MEDICAL CENTER | Age: 27
End: 2017-08-24
Attending: FAMILY MEDICINE
Payer: COMMERCIAL

## 2017-08-24 VITALS
HEIGHT: 71 IN | WEIGHT: 144.4 LBS | HEART RATE: 92 BPM | BODY MASS INDEX: 20.22 KG/M2 | DIASTOLIC BLOOD PRESSURE: 62 MMHG | SYSTOLIC BLOOD PRESSURE: 108 MMHG | TEMPERATURE: 98.2 F | OXYGEN SATURATION: 95 %

## 2017-08-24 DIAGNOSIS — D48.9 NEOPLASM OF UNCERTAIN BEHAVIOR: ICD-10-CM

## 2017-08-24 DIAGNOSIS — R63.4 WEIGHT LOSS: ICD-10-CM

## 2017-08-24 LAB — PATHOLOGY CONSULT NOTE: NORMAL

## 2017-08-24 PROCEDURE — 11100 PR BIOPSY OF SKIN LESION: CPT | Performed by: FAMILY MEDICINE

## 2017-08-24 PROCEDURE — 99214 OFFICE O/P EST MOD 30 MIN: CPT | Mod: 25 | Performed by: FAMILY MEDICINE

## 2017-08-24 PROCEDURE — 88305 TISSUE EXAM BY PATHOLOGIST: CPT

## 2017-08-24 NOTE — MR AVS SNAPSHOT
"Jt House   2017 11:40 AM   Office Visit   MRN: [id number]    Department:  [address] [place] [address]   Dept Phone:  [phone]    Description:  Male : 1990   Provider:  Daniel Abreu M.D.           Reason for Visit     Other see reason for visit      Allergies as of 2017     No Known Allergies      You were diagnosed with     Neoplasm of uncertain behavior   [831189]       Weight loss   [971686]         Vital Signs     Blood Pressure Pulse Temperature Height Weight Body Mass Index    108/62 mmHg 92 36.8 °C (98.2 °F) 1.801 m (5' 10.91\") 65.499 kg (144 lb 6.4 oz) 20.19 kg/m2    Oxygen Saturation Smoking Status                95% Never Smoker           Basic Information     Date Of Birth Sex Race Ethnicity Preferred Language    1990 Male Unknown, White Unknown English      Your appointments     Sep 07, 2017 11:40 AM   Established Patient with Daniel Abreu M.D.   [address] [employer]  [facility] [id number] [phone]           You will be receiving a confirmation call a few days before your appointment from our automated call confirmation system.              Problem List              ICD-10-CM Priority Class Noted - Resolved    Absence epilepsy (CMS-HCC) G40.A09   10/27/2016 - Present    Preventative health care Z00.00   10/27/2016 - Present    Need for vaccination Z23   10/27/2016 - Present    Lipoma of face D17.0   2017 - Present    Weight loss R63.4   2017 - Present    Adjustment disorder, unspecified F43.20   8/10/2017 - Present    Neoplasm of uncertain behavior D48.9   2017 - Present      Health Maintenance        Date Due Completion Dates    IMM INFLUENZA (1) 2017 10/6/2015, 10/21/2014, 10/8/2013, 2008    IMM HEP B VACCINE (3 of 3 - Primary Series) 2017, 2008    IMM HPV VACCINE (3 of 3 - Male 3 Dose Series) 2017, 2017    IMM DTaP/Tdap/Td " Vaccine (7 - Td) 5/18/2019 5/18/2009, 4/25/1996, 8/26/1992, 8/12/1991, 5/13/1991, 3/12/1991            Current Immunizations     Dtap Vaccine 4/25/1996, 8/26/1992, 8/12/1991, 5/13/1991, 3/12/1991    HIB Vaccine (ACTHIB/HIBERIX) 8/26/1992, 8/12/1991, 5/13/1991, 3/12/1991    HPV 9-VALENT VACCINE (GARDASIL 9) 7/20/2017, 4/17/2017    Hepatitis A Vaccine, Adult 4/17/2017    Hepatitis A Vaccine, Ped/Adol 11/24/2008    Hepatitis B Vaccine Non-Recombivax (Ped/Adol) 11/24/2008    Hepatitis B Vaccine Recombivax (Adol/Adult) 7/20/2017    Influenza TIV (IM) 10/21/2014, 10/8/2013, 11/24/2008    Influenza Vaccine Quad Inj (Preserved) 10/6/2015    MMR Vaccine 4/25/1996, 4/13/1992    Meningococcal Conjugate Vaccine MCV4 (Menactra) 11/24/2008    OPV - Historical Data 4/25/1996, 5/13/1991, 4/15/1991, 3/12/1991    Tdap Vaccine 5/18/2009    Varicella Vaccine Live 8/4/2017, 11/24/2008      Below and/or attached are the medications your provider expects you to take. Review all of your home medications and newly ordered medications with your provider and/or pharmacist. Follow medication instructions as directed by your provider and/or pharmacist. Please keep your medication list with you and share with your provider. Update the information when medications are discontinued, doses are changed, or new medications (including over-the-counter products) are added; and carry medication information at all times in the event of emergency situations     Allergies:  No Known Allergies          Medications  Valid as of: August 24, 2017 - 12:07 PM    Generic Name Brand Name Tablet Size Instructions for use    LevETIRAcetam (Tab) KEPPRA 500 MG Take 500 mg by mouth 2 times a day.        .                 Medicines prescribed today were sent to:     [id number] - [place], [place] - [address]    [address] [place] [address]    Phone: [phone] Fax: [phone]    Open 24 Hours?: No      Medication refill instructions:       If your  prescription bottle indicates you have medication refills left, it is not necessary to call your provider’s office. Please contact your pharmacy and they will refill your medication.    If your prescription bottle indicates you do not have any refills left, you may request refills at any time through one of the following ways: The online [employer] system (except Urgent Care), by calling your provider’s office, or by asking your pharmacy to contact your provider’s office with a refill request. Medication refills are processed only during regular business hours and may not be available until the next business day. Your provider may request additional information or to have a follow-up visit with you prior to refilling your medication.   *Please Note: Medication refills are assigned a new Rx number when refilled electronically. Your pharmacy may indicate that no refills were authorized even though a new prescription for the same medication is available at the pharmacy. Please request the medicine by name with the pharmacy before contacting your provider for a refill.        Your To Do List     Future Labs/Procedures Complete By Expires    PATHOLOGY SPECIMEN  As directed 8/24/2018         [employer] Status: Patient Declined

## 2017-08-24 NOTE — ASSESSMENT & PLAN NOTE
I was concerned for an eating disorder given his thin body status. He was seen in mental health and this was ruled out. He states he will continue to increase muscle mass through lifting weights. He continues to E 2200 aristides per day as well as weight training 3 times per week. He is also doing cardio 2 times per week

## 2017-08-24 NOTE — PROGRESS NOTES
This medical record contains text that has been entered with the assistance of computer voice recognition and dictation software.  Therefore, it may contain unintended errors in text, spelling, punctuation, or grammar    Chief Complaint   Patient presents with   • Other     see reason for visit       Jt House is a 26 y.o. male here evaluation and management of: excision, malnutrition      HPI:     Weight loss  I was concerned for an eating disorder given his thin body status. He was seen in mental health and this was ruled out. He states he will continue to increase muscle mass through lifting weights. He continues to E 2200 aristides per day as well as weight training 3 times per week. He is also doing cardio 2 times per week    Neoplasm of uncertain behavior  We noticed several atypical macules on a routine skin exam.         Current medicines (including changes today)  Current Outpatient Prescriptions   Medication Sig Dispense Refill   • levetiracetam (KEPPRA) 500 MG Tab Take 500 mg by mouth 2 times a day.  3     No current facility-administered medications for this visit.     He  has a past medical history of Seizure (CMS-[facility]).  He  has past surgical history that includes appendectomy (2003).  Social History   Substance Use Topics   • Smoking status: Never Smoker    • Smokeless tobacco: Never Used   • Alcohol Use: 0.0 oz/week     0 Standard drinks or equivalent per week      Comment: ocassion     Social History     Social History Narrative     Family History   Problem Relation Age of Onset   • Other Mother      Lupus   • Heart Disease Father    • Lung Disease Father    • Alcohol abuse Father    • No Known Problems Brother    • Psychiatry Maternal Grandmother      depression   • Stroke Maternal Grandmother    • Heart Disease Maternal Grandfather    • No Known Problems Paternal Grandmother    • Cancer Paternal Grandfather      prostate cancer   • Diabetes Brother    • Asthma Brother    • Psychiatry Brother   "    Family Status   Relation Status Death Age   • Mother Alive    • Father     • Brother Alive    • Maternal Grandmother     • Maternal Grandfather     • Paternal Grandmother Alive    • Paternal Grandfather     • Brother Alive          ROS    Please see hpi     All other systems reviewed and are negative     Objective:     Blood pressure 108/62, pulse 92, temperature 36.8 °C (98.2 °F), height 1.801 m (5' 10.91\"), weight 65.499 kg (144 lb 6.4 oz), SpO2 95 %. Body mass index is 20.19 kg/(m^2).  Physical Exam:    Excision--4mm round, brown, asymetric, macule on right upper chest    After informed written consent was obtained, using Betadine for cleansing and 1% Lidocaine w- epinephrine for anesthetic, with sterile technique a 6 mm punch tool was used to obtain and excise  the lesion. Hemostasis was obtained by pressure and wound was  Sutured  With 6.0 Nylon x1. Antibiotic dressing is applied, and wound care instructions provided. Be alert for any signs of cutaneous infection. The specimen is labeled and sent to pathology for evaluation. The procedure was well tolerated without complications.    Eye: Equal, round and reactive, conjunctiva clear, lids normal.  ENMT: Lips without lesions, good dentition, oropharynx clear.  Neck: Trachea midline, no masses, no thyromegaly. No cervical or supraclavicular lymphadenopathy.  Respiratory: Unlabored respiratory effort, lungs clear to auscultation, no wheezes, no ronchi.  Cardiovascular: Normal S1, S2, no murmur, no edema.  Abdomen: Soft, non-tender, no masses, no hepatosplenomegaly.  Psych: Alert and oriented x3, normal affect and mood.          Assessment and Plan:   The following treatment plan was discussed      1. Neoplasm of uncertain behavior  Patient tollerrated procedure well  There were no adverse events  Patient was given post procedure precautions     - PATHOLOGY SPECIMEN; Future    2. Weight loss    He was seen in mental health and " there was no concern for eating disorder  Will monitor closely      HEALTH MAINTENANCE: up to date    Instructed to Follow up in clinic or ER for worsening symptoms, difficulty breathing, lack of expected recovery, or should new symptoms or problems arise.    Followup: Return in about 2 weeks (around 9/7/2017) for Suture Removal.       Once again this medical record contains text that has been entered with the assistance of computer voice recognition and dictation software.  Therefore, it may contain unintended errors in text, spelling, punctuation, or grammar

## 2017-09-07 ENCOUNTER — OFFICE VISIT (OUTPATIENT)
Dept: MEDICAL GROUP | Age: 27
End: 2017-09-07
Payer: COMMERCIAL

## 2017-09-07 VITALS
SYSTOLIC BLOOD PRESSURE: 112 MMHG | HEART RATE: 64 BPM | HEIGHT: 71 IN | BODY MASS INDEX: 20.19 KG/M2 | OXYGEN SATURATION: 99 % | WEIGHT: 144.2 LBS | DIASTOLIC BLOOD PRESSURE: 64 MMHG | TEMPERATURE: 97.9 F

## 2017-09-07 DIAGNOSIS — Z23 NEED FOR VACCINATION: ICD-10-CM

## 2017-09-07 DIAGNOSIS — Z48.02 VISIT FOR SUTURE REMOVAL: ICD-10-CM

## 2017-09-07 PROCEDURE — 90471 IMMUNIZATION ADMIN: CPT | Performed by: FAMILY MEDICINE

## 2017-09-07 PROCEDURE — 90686 IIV4 VACC NO PRSV 0.5 ML IM: CPT | Performed by: FAMILY MEDICINE

## 2017-09-07 NOTE — PROGRESS NOTES
This medical record contains text that has been entered with the assistance of computer voice recognition and dictation software.  Therefore, it may contain unintended errors in text, spelling, punctuation, or grammar    Chief Complaint   Patient presents with   • Other     see reason for visit       Jt House is a 26 y.o. male here evaluation and management of: suture removal      HPI:     Visit for suture removal    The patient underwent excision and returns for suture removal.        Current medicines (including changes today)  Current Outpatient Prescriptions   Medication Sig Dispense Refill   • levetiracetam (KEPPRA) 500 MG Tab Take 500 mg by mouth 2 times a day.  3     No current facility-administered medications for this visit.      He  has a past medical history of Seizure (CMS-[facility]).  He  has a past surgical history that includes appendectomy ().  Social History   Substance Use Topics   • Smoking status: Never Smoker   • Smokeless tobacco: Never Used   • Alcohol use 0.0 oz/week      Comment: ocassion     Social History     Social History Narrative   • No narrative on file     Family History   Problem Relation Age of Onset   • Other Mother      Lupus   • Heart Disease Father    • Lung Disease Father    • Alcohol abuse Father    • No Known Problems Brother    • Psychiatry Maternal Grandmother      depression   • Stroke Maternal Grandmother    • Heart Disease Maternal Grandfather    • No Known Problems Paternal Grandmother    • Cancer Paternal Grandfather      prostate cancer   • Diabetes Brother    • Asthma Brother    • Psychiatry Brother      Family Status   Relation Status   • Mother Alive   • Father    • Brother Alive   • Maternal Grandmother    • Maternal Grandfather    • Paternal Grandmother Alive   • Paternal Grandfather    • Brother Alive         ROS    Please see hpi     All other systems reviewed and are negative     Objective:     Blood pressure 112/64,  "pulse 64, temperature 36.6 °C (97.9 °F), height 1.801 m (5' 10.91\"), weight 65.4 kg (144 lb 3.2 oz), SpO2 99 %. Body mass index is 20.17 kg/m².  Physical Exam:          Sutures removed in normal clean fashion.  There were no adverse events.    Neck: Trachea midline, no masses, no thyromegaly. No cervical or supraclavicular lymphadenopathy.  Respiratory: Unlabored respiratory effort, lungs clear to auscultation, no wheezes, no ronchi.  Cardiovascular: Normal S1, S2, no murmur, no edema.  Abdomen: Soft, non-tender, no masses, no hepatosplenomegaly.  Psych: Alert and oriented x3, normal affect and mood.          Assessment and Plan:   The following treatment plan was discussed      1. Need for vaccination  Given today    - Flu Quad Inj >3 Year Pre-Filled (Preservative Free)    2. Visit for suture removal  Patient tollerrated procedure well  There were no adverse events  Patient was given post procedure precautions           HEALTH MAINTENANCE:    Instructed to Follow up in clinic or ER for worsening symptoms, difficulty breathing, lack of expected recovery, or should new symptoms or problems arise.    Followup: Return in about 3 months (around 12/7/2017) for Reevaluation.       Once again this medical record contains text that has been entered with the assistance of computer voice recognition and dictation software.  Therefore, it may contain unintended errors in text, spelling, punctuation, or grammar         "

## 2021-06-14 ENCOUNTER — OFFICE VISIT (OUTPATIENT)
Dept: URBAN - METROPOLITAN AREA CLINIC 12 | Facility: CLINIC | Age: 31
End: 2021-06-14

## 2021-06-30 ENCOUNTER — LAB OUTSIDE AN ENCOUNTER (OUTPATIENT)
Dept: URBAN - METROPOLITAN AREA CLINIC 12 | Facility: CLINIC | Age: 31
End: 2021-06-30

## 2021-06-30 ENCOUNTER — DASHBOARD ENCOUNTERS (OUTPATIENT)
Age: 31
End: 2021-06-30

## 2021-06-30 ENCOUNTER — OFFICE VISIT (OUTPATIENT)
Dept: URBAN - METROPOLITAN AREA CLINIC 12 | Facility: CLINIC | Age: 31
End: 2021-06-30
Payer: COMMERCIAL

## 2021-06-30 VITALS
SYSTOLIC BLOOD PRESSURE: 125 MMHG | HEIGHT: 61 IN | BODY MASS INDEX: 34.32 KG/M2 | WEIGHT: 181.8 LBS | DIASTOLIC BLOOD PRESSURE: 79 MMHG | HEART RATE: 61 BPM | TEMPERATURE: 97.9 F

## 2021-06-30 DIAGNOSIS — R10.13 EPIGASTRIC PAIN: ICD-10-CM

## 2021-06-30 DIAGNOSIS — K62.5 RECTAL BLEEDING: ICD-10-CM

## 2021-06-30 PROCEDURE — 99204 OFFICE O/P NEW MOD 45 MIN: CPT | Performed by: STUDENT IN AN ORGANIZED HEALTH CARE EDUCATION/TRAINING PROGRAM

## 2021-06-30 RX ORDER — SODIUM SULFATE, MAGNESIUM SULFATE, AND POTASSIUM CHLORIDE 17.75; 2.7; 2.25 G/1; G/1; G/1
12 TABLETS TABLET ORAL
Qty: 24 TABLET | Refills: 0 | OUTPATIENT
Start: 2021-06-30 | End: 2021-07-01

## 2021-06-30 RX ORDER — OMEPRAZOLE 40 MG/1
1 CAPSULE 30 MINUTES BEFORE MORNING MEAL CAPSULE, DELAYED RELEASE ORAL ONCE A DAY
Qty: 30 | Refills: 11 | OUTPATIENT
Start: 2021-06-30

## 2021-06-30 NOTE — HPI-TODAY'S VISIT:
31 yo M PMH significant for migraines and epilepsy here for evaluation of intermittent abdominal pain. Epigastric area for the past 2 months. Occurs for a few days every month , usually for 2 day spurts.  Usually occurs 1-2 hours after eating dinner, lasts a few hours and then goes away by the time he wakes up the next morning. Pain is severe 7/10 pulses, feels nauseous.  Dinner is usually his heaviest meal.  He tried to identify food triggers  Since he was a child he reports having periodic blood in stool. Bright red rectal bleeding. Associated with a bowel movement. Painless.  No known h/o anemia. Denies unintentional weight loss Usually has a bowel movement daily, denies constipation or hard stool.  He gets migraines a few times a month, usually a few days at a time, for which he takes 400mg - 800mg ibuprofen.  He was given a 2 week course of famotidine by a telehealth doctor in mid May; did not help his symptoms.

## 2021-07-02 LAB — H PYLORI BREATH TEST: POSITIVE

## 2021-07-05 ENCOUNTER — TELEPHONE ENCOUNTER (OUTPATIENT)
Dept: URBAN - METROPOLITAN AREA CLINIC 92 | Facility: CLINIC | Age: 31
End: 2021-07-05

## 2021-07-05 RX ORDER — AMOXICILLIN 500 MG/1
2 CAPSULES CAPSULE ORAL
Qty: 56 CAPSULE | Refills: 0 | OUTPATIENT
Start: 2021-07-05 | End: 2021-07-19

## 2021-07-05 RX ORDER — OMEPRAZOLE 40 MG/1
1 CAPSULE 30 MINUTES BEFORE MORNING MEAL CAPSULE, DELAYED RELEASE ORAL ONCE A DAY
Qty: 30 | Refills: 11 | Status: ACTIVE | COMMUNITY
Start: 2021-06-30

## 2021-07-05 RX ORDER — CLARITHROMYCIN 500 MG/1
1 TABLET TABLET, FILM COATED ORAL
Qty: 28 TABLET | Refills: 0 | OUTPATIENT
Start: 2021-07-05 | End: 2021-07-19

## 2021-07-05 RX ORDER — OMEPRAZOLE 40 MG/1
AS DIRECTED CAPSULE, DELAYED RELEASE ORAL TWICE DAILY
Qty: 28 CAPSULE | Refills: 0 | OUTPATIENT
Start: 2021-07-05

## 2021-07-06 ENCOUNTER — OFFICE VISIT (OUTPATIENT)
Dept: URBAN - METROPOLITAN AREA LAB 3 | Facility: LAB | Age: 31
End: 2021-07-06
Payer: COMMERCIAL

## 2021-07-06 DIAGNOSIS — K31.89 ACQUIRED DEFORMITY OF DUODENUM: ICD-10-CM

## 2021-07-06 DIAGNOSIS — R10.13 ABDOMINAL DISCOMFORT, EPIGASTRIC: ICD-10-CM

## 2021-07-06 DIAGNOSIS — K62.5 ANAL BLEEDING: ICD-10-CM

## 2021-07-06 PROCEDURE — 43235 EGD DIAGNOSTIC BRUSH WASH: CPT | Performed by: STUDENT IN AN ORGANIZED HEALTH CARE EDUCATION/TRAINING PROGRAM

## 2021-07-06 PROCEDURE — 45378 DIAGNOSTIC COLONOSCOPY: CPT | Performed by: STUDENT IN AN ORGANIZED HEALTH CARE EDUCATION/TRAINING PROGRAM

## 2024-09-25 ENCOUNTER — OFFICE VISIT (OUTPATIENT)
Dept: URGENT CARE | Facility: CLINIC | Age: 34
End: 2024-09-25
Payer: COMMERCIAL

## 2024-09-25 VITALS
HEIGHT: 70 IN | HEART RATE: 100 BPM | OXYGEN SATURATION: 98 % | RESPIRATION RATE: 20 BRPM | WEIGHT: 229 LBS | BODY MASS INDEX: 32.78 KG/M2 | DIASTOLIC BLOOD PRESSURE: 78 MMHG | SYSTOLIC BLOOD PRESSURE: 118 MMHG | TEMPERATURE: 98.4 F

## 2024-09-25 DIAGNOSIS — J06.9 VIRAL URI WITH COUGH: ICD-10-CM

## 2024-09-25 PROCEDURE — 99203 OFFICE O/P NEW LOW 30 MIN: CPT | Performed by: STUDENT IN AN ORGANIZED HEALTH CARE EDUCATION/TRAINING PROGRAM

## 2024-09-25 PROCEDURE — 3078F DIAST BP <80 MM HG: CPT | Performed by: STUDENT IN AN ORGANIZED HEALTH CARE EDUCATION/TRAINING PROGRAM

## 2024-09-25 PROCEDURE — 3074F SYST BP LT 130 MM HG: CPT | Performed by: STUDENT IN AN ORGANIZED HEALTH CARE EDUCATION/TRAINING PROGRAM

## 2024-09-25 NOTE — PROGRESS NOTES
"Subjective:   Jt House is a 33 y.o. male who presents for Headache (X3 days, \" Sore throat that started Monday, I am still experiencing a bad headache. Nasal congestion.\" )      HPI:  33-year-old male presents to urgent care for 4 days of sore throat, headache, nasal congestion, fatigue.  Patient's son had a sore throat approximately 1 week ago.  He is doing better at this time.  No nausea, vomiting, abdominal pain, fever, chills, sputum production.    Medications:    This patient does not have an active medication from one of the medication groupers.    Allergies: Patient has no known allergies.    Problem List: Jt House does not have any pertinent problems on file.    Surgical History:  Past Surgical History:   Procedure Laterality Date    APPENDECTOMY  2003       Past Social Hx: Jt House  reports that he has never smoked. He has never used smokeless tobacco. He reports current alcohol use. He reports current drug use. Drug: Marijuana.     Past Family Hx:  Jt House family history includes Alcohol abuse in his father; Asthma in his brother; Cancer in his paternal grandfather; Diabetes in his brother; Heart Disease in his father and maternal grandfather; Lung Disease in his father; No Known Problems in his brother and paternal grandmother; Other in his mother; Psychiatric Illness in his brother and maternal grandmother; Stroke in his maternal grandmother.     Problem list, medications, and allergies reviewed by myself today in Epic.     Objective:     /78 (BP Location: Left arm, Patient Position: Sitting, BP Cuff Size: Large adult)   Pulse 100   Temp 36.9 °C (98.4 °F) (Temporal)   Resp 20   Ht 1.778 m (5' 10\")   Wt 104 kg (229 lb)   SpO2 98%   BMI 32.86 kg/m²     Physical Exam  Vitals reviewed.   HENT:      Right Ear: Tympanic membrane, ear canal and external ear normal.      Left Ear: Tympanic membrane, ear canal and external ear normal.      Nose: Congestion " present.      Mouth/Throat:      Mouth: Mucous membranes are moist.      Pharynx: Uvula midline. Posterior oropharyngeal erythema present.      Tonsils: No tonsillar exudate. 0 on the right. 0 on the left.   Cardiovascular:      Rate and Rhythm: Normal rate and regular rhythm.      Pulses: Normal pulses.      Heart sounds: Normal heart sounds. No murmur heard.  Pulmonary:      Effort: Pulmonary effort is normal. No respiratory distress.      Breath sounds: Normal breath sounds. No stridor. No wheezing, rhonchi or rales.   Lymphadenopathy:      Cervical: No cervical adenopathy.         Assessment/Plan:     Diagnosis and associated orders:     1. Viral URI with cough           Comments/MDM:     Patient's presentation physical exam findings are consistent with viral upper respiratory tract infection.  This should be self-limited.  Exam today not consistent with strep throat.  Patient does have a migraine which she does have a history of and reports he has had medication for at home but has not had significantly helped.  Offered Toradol injection in clinic but patient declined and would like to continue with OTC medication.  Discussed typical timeframe for resolution of symptoms.         Differential diagnosis, natural history, supportive care, and indications for immediate follow-up discussed.    Advised the patient to follow-up with the primary care physician for recheck, reevaluation, and consideration of further management.    Please note that this dictation was created using voice recognition software. I have made a reasonable attempt to correct obvious errors, but I expect that there are errors of grammar and possibly content that I did not discover before finalizing the note.    Electronically signed by Wilian Martinez PA-C.       Spironolactone Counseling: Patient advised regarding risks of diarrhea, abdominal pain, hyperkalemia, birth defects (for female patients), liver toxicity and renal toxicity. The patient may need blood work to monitor liver and kidney function and potassium levels while on therapy. The patient verbalized understanding of the proper use and possible adverse effects of spironolactone.  All of the patient's questions and concerns were addressed.